# Patient Record
Sex: FEMALE | URBAN - METROPOLITAN AREA
[De-identification: names, ages, dates, MRNs, and addresses within clinical notes are randomized per-mention and may not be internally consistent; named-entity substitution may affect disease eponyms.]

---

## 2024-01-12 ENCOUNTER — OFFICE VISIT (OUTPATIENT)
Dept: URBAN - METROPOLITAN AREA CLINIC 113 | Facility: CLINIC | Age: 24
End: 2024-01-12

## 2024-01-12 ENCOUNTER — DASHBOARD ENCOUNTERS (OUTPATIENT)
Age: 24
End: 2024-01-12

## 2024-01-12 ENCOUNTER — OFFICE VISIT (OUTPATIENT)
Dept: URBAN - METROPOLITAN AREA CLINIC 113 | Facility: CLINIC | Age: 24
End: 2024-01-12
Payer: COMMERCIAL

## 2024-01-12 VITALS
SYSTOLIC BLOOD PRESSURE: 95 MMHG | BODY MASS INDEX: 38.98 KG/M2 | RESPIRATION RATE: 16 BRPM | HEART RATE: 66 BPM | WEIGHT: 220 LBS | DIASTOLIC BLOOD PRESSURE: 64 MMHG | HEIGHT: 63 IN | TEMPERATURE: 97.3 F

## 2024-01-12 DIAGNOSIS — K80.20 SYMPTOMATIC CHOLELITHIASIS: ICD-10-CM

## 2024-01-12 DIAGNOSIS — R10.13 EPIGASTRIC PAIN: ICD-10-CM

## 2024-01-12 PROBLEM — 266474003: Status: ACTIVE | Noted: 2024-01-12

## 2024-01-12 PROCEDURE — 99204 OFFICE O/P NEW MOD 45 MIN: CPT | Performed by: STUDENT IN AN ORGANIZED HEALTH CARE EDUCATION/TRAINING PROGRAM

## 2024-01-12 RX ORDER — OMEPRAZOLE 40 MG/1
1 CAPSULE 30 MINUTES BEFORE MORNING MEAL CAPSULE, DELAYED RELEASE ORAL ONCE A DAY
Qty: 90 | Refills: 0 | OUTPATIENT
Start: 2024-01-12

## 2024-01-12 RX ORDER — PROMETHAZINE HYDROCHLORIDE 25 MG/1
1 SUPPOSITORY AS NEEDED SUPPOSITORY RECTAL
Status: ACTIVE | COMMUNITY

## 2024-01-12 RX ORDER — HYDROCODONE BITARTRATE AND ACETAMINOPHEN 5; 325 MG/1; MG/1
1 TABLET AS NEEDED TABLET ORAL
Status: ACTIVE | COMMUNITY

## 2024-01-12 NOTE — HPI-TODAY'S VISIT:
Initial visit 1/12/2024; PCP Dr. Ramses May Ms. Tucker is a 23-year-old female.  Symptoms started 3 days prior to arrival and was acute in onset.  She  presented to the [facility] ER on 1/7/2024 with abdominal pain and diarrhea.  Labs 1/7/2024: WBC 6.0, hemoglobin 12.0, platelet 370, sodium 141, potassium 3.9, BUN 18, creatinine 0.8, liver enzymes within normal limits, lipase 171 [upper limit of normal 53], slightly hypertensive.  She was given a prescription for promethazine and tramadol after diagnosis of pancreatitis and discharged home.  She returned to the ER at [facility] on 1/10/2024 with persistent abdominal pain that was not adequately treated with tramadol.  Labs 1/10/2024: WBC 8.0, hemoglobin 12.1, platelet 383, liver enzymes within normal limits, BUN 13, creatinine 0.8, lipase 176.  Slightly hypertensive.  She had a CT scan of the abdomen/pelvis with contrast 1/10/2024 that revealed gallstones, no evidence of acute cholecystitis.Patient states that her abdominal pain has improved.  However she is still using Percocet mainly with meals.  She states the pain prior to going to the hospital was not necessarily related to meals but would happen frequently throughout the day described as epigastric pain radiating into her back and associated with nausea.  She is feeling much better today.

## 2024-01-12 NOTE — PHYSICAL EXAM GASTROINTESTINAL
Abdomen , soft, mild RUQ tenderness, negative [place] sign, nondistended , no guarding or rigidity , no masses palpable , normal bowel sounds , Liver and Spleen , no hepatomegaly present , no hepatosplenomegaly , liver nontender , spleen not palpable

## 2024-01-30 ENCOUNTER — OFFICE VISIT (OUTPATIENT)
Dept: URBAN - METROPOLITAN AREA CLINIC 113 | Facility: CLINIC | Age: 24
End: 2024-01-30

## 2024-02-06 ENCOUNTER — OV NP (OUTPATIENT)
Dept: URBAN - METROPOLITAN AREA CLINIC 113 | Facility: CLINIC | Age: 24
End: 2024-02-06

## 2024-02-07 ENCOUNTER — OV NP (OUTPATIENT)
Dept: URBAN - METROPOLITAN AREA CLINIC 113 | Facility: CLINIC | Age: 24
End: 2024-02-07